# Patient Record
Sex: FEMALE | Race: WHITE | NOT HISPANIC OR LATINO | Employment: FULL TIME | URBAN - METROPOLITAN AREA
[De-identification: names, ages, dates, MRNs, and addresses within clinical notes are randomized per-mention and may not be internally consistent; named-entity substitution may affect disease eponyms.]

---

## 2017-05-06 ENCOUNTER — OFFICE VISIT (OUTPATIENT)
Dept: URGENT CARE | Facility: CLINIC | Age: 27
End: 2017-05-06
Payer: COMMERCIAL

## 2017-05-06 VITALS
OXYGEN SATURATION: 98 % | HEIGHT: 65 IN | WEIGHT: 137 LBS | SYSTOLIC BLOOD PRESSURE: 102 MMHG | HEART RATE: 61 BPM | BODY MASS INDEX: 22.82 KG/M2 | DIASTOLIC BLOOD PRESSURE: 66 MMHG | TEMPERATURE: 97.9 F

## 2017-05-06 DIAGNOSIS — S61.309A FINGERNAIL AVULSION, INITIAL ENCOUNTER: ICD-10-CM

## 2017-05-06 PROCEDURE — 99214 OFFICE O/P EST MOD 30 MIN: CPT | Performed by: PHYSICIAN ASSISTANT

## 2017-05-06 ASSESSMENT — ENCOUNTER SYMPTOMS
MUSCULOSKELETAL NEGATIVE: 1
WEAKNESS: 0
JOINT SWELLING: 0
NEUROLOGICAL NEGATIVE: 1
CONSTITUTIONAL NEGATIVE: 1
NUMBNESS: 0

## 2017-05-06 NOTE — MR AVS SNAPSHOT
"        Eleni Damon   2017 2:45 PM   Office Visit   MRN: [id number]    Department:  [facility]   Dept Phone:  [phone]    Description:  Female : 1990   Provider:  Jimmy Gale PA-C           Reason for Visit     Nail Problem X today, broke nail back on left hand       Allergies as of 2017     No Known Allergies      You were diagnosed with     Fingernail avulsion, initial encounter   [[id number]]         Vital Signs     Blood Pressure Pulse Temperature Height Weight Body Mass Index    102/66 mmHg 61 36.6 °C (97.9 °F) 1.651 m (5' 5\") 62.143 kg (137 lb) 22.80 kg/m2    Oxygen Saturation Last Menstrual Period Breastfeeding? Smoking Status          98% 2016 Yes Never Smoker         Basic Information     Date Of Birth Sex Race Ethnicity Preferred Language    1990 Female White Non- English      Problem List              ICD-10-CM Priority Class Noted - Resolved    Labor and delivery, indication for care O75.9   2016 - Present      Health Maintenance        Date Due Completion Dates    PAP SMEAR 8/10/2009 ---    IMM DTaP/Tdap/Td Vaccine (2 - Td) 2026            Current Immunizations     Tdap Vaccine 2016      Below and/or attached are the medications your provider expects you to take. Review all of your home medications and newly ordered medications with your provider and/or pharmacist. Follow medication instructions as directed by your provider and/or pharmacist. Please keep your medication list with you and share with your provider. Update the information when medications are discontinued, doses are changed, or new medications (including over-the-counter products) are added; and carry medication information at all times in the event of emergency situations     Allergies:  No Known Allergies          Medications  Valid as of: May 06, 2017 -  3:25 PM    Generic Name Brand Name Tablet Size Instructions for use    Prenatal MV-Min-Fe Fum-FA-DHA   Take  by " mouth.        .                 Medicines prescribed today were sent to:     [id number] - [place], [place] - [address]    [address] [place] [address]    Phone: [phone] Fax: [phone]    Open 24 Hours?: No      Medication refill instructions:       If your prescription bottle indicates you have medication refills left, it is not necessary to call your provider’s office. Please contact your pharmacy and they will refill your medication.    If your prescription bottle indicates you do not have any refills left, you may request refills at any time through one of the following ways: The online [employer] system (except Urgent Care), by calling your provider’s office, or by asking your pharmacy to contact your provider’s office with a refill request. Medication refills are processed only during regular business hours and may not be available until the next business day. Your provider may request additional information or to have a follow-up visit with you prior to refilling your medication.   *Please Note: Medication refills are assigned a new Rx number when refilled electronically. Your pharmacy may indicate that no refills were authorized even though a new prescription for the same medication is available at the pharmacy. Please request the medicine by name with the pharmacy before contacting your provider for a refill.           [employer] Access Code: [email or web address]  Expires: 6/5/2017  3:25 PM    [employer]  A secure, online tool to manage your health information     [employer]’s [employer]® is a secure, online tool that connects you to your personalized health information from the privacy of your home -- day or night - making it very easy for you to manage your healthcare. Once the activation process is completed, you can even access your medical information using the [employer] cristobal, which is available for free in the Apple Cristobal store or Google Play store.     [employer] provides the following levels  of access (as shown below):   My Chart Features   [facility] Doctor [facility]  Specialists [facility]  Urgent  Care Non-[facility]  Primary Care  Doctor   Email your healthcare team securely and privately 24/7 X X X    Manage appointments: schedule your next appointment; view details of past/upcoming appointments X      Request prescription refills. X      View recent personal medical records, including lab and immunizations X X X X   View health record, including health history, allergies, medications X X X X   Read reports about your outpatient visits, procedures, consult and ER notes X X X X   See your discharge summary, which is a recap of your hospital and/or ER visit that includes your diagnosis, lab results, and care plan. X X       How to register for [employer]:  1. Go to  [email or web address].  2. Click on the Sign Up Now box, which takes you to the New Member Sign Up page. You will need to provide the following information:  a. Enter your Pin digital Access Code exactly as it appears at the top of this page. (You will not need to use this code after you’ve completed the sign-up process. If you do not sign up before the expiration date, you must request a new code.)   b. Enter your date of birth.   c. Enter your home email address.   d. Click Submit, and follow the next screen’s instructions.  3. Create a Pin digital ID. This will be your [employer] digital login ID and cannot be changed, so think of one that is secure and easy to remember.  4. Create a Pin digital password. You can change your password at any time.  5. Enter your Password Reset Question and Answer. This can be used at a later time if you forget your password.   6. Enter your e-mail address. This allows you to receive e-mail notifications when new information is available in Pin digital.  7. Click Sign Up. You can now view your health information.    For assistance activating your [employer] digital account, call [phone]

## 2017-05-06 NOTE — PROGRESS NOTES
"Subjective:      Eleni Jose is a 26 y.o. female who presents with Nail Problem            Nail Problem  This is a new problem. The current episode started today (fingernail avuls). The problem occurs constantly. The problem has been unchanged. Pertinent negatives include no joint swelling, numbness or weakness. Nothing aggravates the symptoms. She has tried nothing for the symptoms. The treatment provided no relief.       Review of Systems   Constitutional: Negative.    Musculoskeletal: Negative.  Negative for joint swelling.   Skin: Negative.    Neurological: Negative.  Negative for weakness and numbness.          Objective:     /66 mmHg  Pulse 61  Temp(Src) 36.6 °C (97.9 °F)  Ht 1.651 m (5' 5\")  Wt 62.143 kg (137 lb)  BMI 22.80 kg/m2  SpO2 98%  LMP 02/05/2016  Breastfeeding? Yes     Physical Exam   Constitutional: She is oriented to person, place, and time. She appears well-developed and well-nourished. No distress.   Musculoskeletal: Normal range of motion. She exhibits tenderness. She exhibits no edema.   4th fingernail sl loose  Proc- 1%lidoc local; undermined nail w/hemostats, pulled nail off w/o problem, n.bed nl. , abx dssg     Neurological: She is alert and oriented to person, place, and time. She exhibits normal muscle tone. Coordination normal.   Skin: Skin is warm and dry. No erythema.   Psychiatric: She has a normal mood and affect. Her behavior is normal. Judgment and thought content normal.   Nursing note and vitals reviewed.    Filed Vitals:    05/06/17 1452   BP: 102/66   Pulse: 61   Temp: 36.6 °C (97.9 °F)   Height: 1.651 m (5' 5\")   Weight: 62.143 kg (137 lb)   SpO2: 98%     Active Ambulatory Problems     Diagnosis Date Noted   • Labor and delivery, indication for care 11/08/2016     Resolved Ambulatory Problems     Diagnosis Date Noted   • No Resolved Ambulatory Problems     No Additional Past Medical History     Current Outpatient Prescriptions on File Prior to Visit   Medication " Sig Dispense Refill   • Prenatal MV-Min-Fe Fum-FA-DHA (PRENATAL 1 PO) Take  by mouth.       No current facility-administered medications on file prior to visit.     Gargles, Cepacol lozenges, Aleve/Advil as needed for throat pain  History reviewed. No pertinent family history.  Review of patient's allergies indicates no known allergies.              Assessment/Plan:     ·  fingernail avuls      · Daily wound care, nsaids

## 2018-02-05 ENCOUNTER — OFFICE VISIT (OUTPATIENT)
Dept: URGENT CARE | Facility: CLINIC | Age: 28
End: 2018-02-05
Payer: COMMERCIAL

## 2018-02-05 VITALS
DIASTOLIC BLOOD PRESSURE: 70 MMHG | BODY MASS INDEX: 22.49 KG/M2 | TEMPERATURE: 98.2 F | WEIGHT: 135 LBS | SYSTOLIC BLOOD PRESSURE: 110 MMHG | RESPIRATION RATE: 20 BRPM | OXYGEN SATURATION: 98 % | HEART RATE: 80 BPM | HEIGHT: 65 IN

## 2018-02-05 DIAGNOSIS — J02.9 EXUDATIVE PHARYNGITIS: ICD-10-CM

## 2018-02-05 LAB
INT CON NEG: NORMAL
INT CON POS: NORMAL
S PYO AG THROAT QL: NEGATIVE

## 2018-02-05 PROCEDURE — 99214 OFFICE O/P EST MOD 30 MIN: CPT | Performed by: PHYSICIAN ASSISTANT

## 2018-02-05 PROCEDURE — 87880 STREP A ASSAY W/OPTIC: CPT | Performed by: PHYSICIAN ASSISTANT

## 2018-02-05 RX ORDER — AZITHROMYCIN 250 MG/1
TABLET, FILM COATED ORAL
Qty: 6 TAB | Refills: 0 | Status: SHIPPED | OUTPATIENT
Start: 2018-02-05 | End: 2021-03-24

## 2018-02-05 ASSESSMENT — ENCOUNTER SYMPTOMS
MYALGIAS: 0
FEVER: 1
HOARSE VOICE: 1
SENSORY CHANGE: 0
DIARRHEA: 0
SWOLLEN GLANDS: 1
TINGLING: 0
CHILLS: 1
HEADACHES: 1
NAUSEA: 0
WHEEZING: 0
VOMITING: 0
SORE THROAT: 1
COUGH: 1
FOCAL WEAKNESS: 0
PALPITATIONS: 0
SPUTUM PRODUCTION: 1
ABDOMINAL PAIN: 0
SHORTNESS OF BREATH: 0

## 2018-02-05 ASSESSMENT — PATIENT HEALTH QUESTIONNAIRE - PHQ9: CLINICAL INTERPRETATION OF PHQ2 SCORE: 0

## 2018-02-05 NOTE — PROGRESS NOTES
"Subjective:      Eleni Jose is a 27 y.o. female who presents with Pharyngitis            Pharyngitis    This is a new problem. Episode onset: 1 week  The problem has been waxing and waning. The maximum temperature recorded prior to her arrival was 101 - 101.9 F (this am). Associated symptoms include coughing (mild cough- productive with green mucous), headaches, a hoarse voice and swollen glands. Pertinent negatives include no abdominal pain, congestion, diarrhea, ear pain, plugged ear sensation, shortness of breath or vomiting. She has tried NSAIDs for the symptoms. The treatment provided mild relief.       No past medical history on file.    No past surgical history on file.    No family history on file.    No Known Allergies    Medications, Allergies, and current problem list reviewed today in Epic    Review of Systems   Constitutional: Positive for chills, fever and malaise/fatigue.   HENT: Positive for hoarse voice and sore throat. Negative for congestion and ear pain.    Respiratory: Positive for cough (mild cough- productive with green mucous) and sputum production. Negative for shortness of breath and wheezing.    Cardiovascular: Negative for chest pain, palpitations and leg swelling.   Gastrointestinal: Negative for abdominal pain, diarrhea, nausea and vomiting.   Musculoskeletal: Negative for myalgias.   Neurological: Positive for headaches. Negative for tingling, sensory change and focal weakness.     All other systems reviewed and are negative.        Objective:     /70   Pulse 80   Temp 36.8 °C (98.2 °F)   Resp 20   Ht 1.651 m (5' 5\")   Wt 61.2 kg (135 lb)   SpO2 98%   BMI 22.47 kg/m²      Physical Exam   Constitutional: She is oriented to person, place, and time. She appears well-developed and well-nourished. No distress.   HENT:   Head: Normocephalic and atraumatic.   Right Ear: Tympanic membrane, external ear and ear canal normal.   Left Ear: Tympanic membrane, external ear and ear " canal normal.   Nose: Mucosal edema and rhinorrhea present.   Mouth/Throat: Uvula is midline and mucous membranes are normal. Oropharyngeal exudate and posterior oropharyngeal erythema present. Tonsils are 1+ on the right. Tonsils are 1+ on the left. Tonsillar exudate.   Neck: Neck supple.   Cardiovascular: Normal rate, regular rhythm and normal heart sounds.  Exam reveals no gallop and no friction rub.    No murmur heard.  Pulmonary/Chest: Effort normal. No respiratory distress. She has no wheezes. She has no rales.   Lymphadenopathy:     She has cervical adenopathy (mild anterior cervical adenopathy ).   Neurological: She is alert and oriented to person, place, and time. No cranial nerve deficit.   Skin: Skin is warm and dry. No rash noted.   Psychiatric: She has a normal mood and affect. Her behavior is normal. Judgment and thought content normal.               Assessment/Plan:     1. Exudative pharyngitis  azithromycin (ZITHROMAX) 250 MG Tab         Current Outpatient Prescriptions:   •  azithromycin (ZITHROMAX) 250 MG Tab, Take as directed on package. 1 pack., Disp: 6 Tab, Rfl: 0    - encouraged fluids, rest, humidification, salt water gargles and throat lozenges.     Differential diagnoses, Supportive care, and indications for immediate follow-up discussed with patient.   Instructed to return to clinic or nearest emergency department for any change in condition, further concerns, or worsening of symptoms.    The patient demonstrated a good understanding and agreed with the treatment plan.    Salome Farnsworth P.A.-C.

## 2021-02-03 ENCOUNTER — TELEPHONE (OUTPATIENT)
Dept: SCHEDULING | Facility: IMAGING CENTER | Age: 31
End: 2021-02-03

## 2021-02-28 ENCOUNTER — OFFICE VISIT (OUTPATIENT)
Dept: URGENT CARE | Facility: CLINIC | Age: 31
End: 2021-02-28
Payer: COMMERCIAL

## 2021-02-28 VITALS
BODY MASS INDEX: 24.16 KG/M2 | HEIGHT: 65 IN | RESPIRATION RATE: 16 BRPM | WEIGHT: 145 LBS | OXYGEN SATURATION: 97 % | TEMPERATURE: 97.9 F | HEART RATE: 75 BPM | SYSTOLIC BLOOD PRESSURE: 106 MMHG | DIASTOLIC BLOOD PRESSURE: 70 MMHG

## 2021-02-28 DIAGNOSIS — H60.331 ACUTE SWIMMER'S EAR OF RIGHT SIDE: ICD-10-CM

## 2021-02-28 PROCEDURE — 99204 OFFICE O/P NEW MOD 45 MIN: CPT | Performed by: NURSE PRACTITIONER

## 2021-02-28 RX ORDER — OFLOXACIN 3 MG/ML
5 SOLUTION AURICULAR (OTIC) DAILY
Qty: 10 ML | Refills: 0 | Status: SHIPPED | OUTPATIENT
Start: 2021-02-28 | End: 2021-04-14

## 2021-02-28 RX ORDER — CIPROFLOXACIN AND DEXAMETHASONE 3; 1 MG/ML; MG/ML
4 SUSPENSION/ DROPS AURICULAR (OTIC) 2 TIMES DAILY
Qty: 2.8 ML | Refills: 0 | Status: SHIPPED | OUTPATIENT
Start: 2021-02-28 | End: 2021-03-07

## 2021-02-28 ASSESSMENT — ENCOUNTER SYMPTOMS
SORE THROAT: 0
COUGH: 0
CHILLS: 0
DIZZINESS: 0
HEADACHES: 0
MYALGIAS: 0
FEVER: 0
SHORTNESS OF BREATH: 0

## 2021-02-28 ASSESSMENT — PAIN SCALES - GENERAL: PAINLEVEL: 5=MODERATE PAIN

## 2021-02-28 NOTE — PROGRESS NOTES
Subjective:     Eleni Jose is a 30 y.o. female who presents for Otalgia (right ear x2 days)      HPI  Pt presents for evaluation of a new problem.  Eleni is a pleasant 30-year-old female who presents to urgent care today with complaints of right ear pain that started yesterday.  Her pain initially started when she got home from [place].  She notes that she was visiting [place].  Her pain level is rated as a 6/10.  She has been using ibuprofen for the pain which has provided moderate relief. Associated symptoms include stomach bug, fever, body aches. This has all resolved except for her ear pain.     Review of Systems   Constitutional: Negative for chills, fever and malaise/fatigue.   HENT: Positive for ear pain. Negative for congestion, hearing loss, sore throat and tinnitus.    Respiratory: Negative for cough and shortness of breath.    Musculoskeletal: Negative for myalgias.   Skin: Negative for rash.   Neurological: Negative for dizziness and headaches.       PMH: History reviewed. No pertinent past medical history.  ALLERGIES: No Known Allergies  SURGHX: History reviewed. No pertinent surgical history.  SOCHX:   Social History     Socioeconomic History   • Marital status:      Spouse name: Not on file   • Number of children: Not on file   • Years of education: Not on file   • Highest education level: Not on file   Occupational History   • Not on file   Tobacco Use   • Smoking status: Never Smoker   • Smokeless tobacco: Never Used   Substance and Sexual Activity   • Alcohol use: No   • Drug use: No   • Sexual activity: Not on file   Other Topics Concern   • Not on file   Social History Narrative   • Not on file     Social Determinants of Health     Financial Resource Strain:    • Difficulty of Paying Living Expenses:    Food Insecurity:    • Worried About Running Out of Food in the Last Year:    • Ran Out of Food in the Last Year:    Transportation Needs:    •  "Lack of Transportation (Medical):    • Lack of Transportation (Non-Medical):    Physical Activity:    • Days of Exercise per Week:    • Minutes of Exercise per Session:    Stress:    • Feeling of Stress :    Social Connections:    • Frequency of Communication with Friends and Family:    • Frequency of Social Gatherings with Friends and Family:    • Attends Taoism Services:    • Active Member of Clubs or Organizations:    • Attends Club or Organization Meetings:    • Marital Status:    Intimate Partner Violence:    • Fear of Current or Ex-Partner:    • Emotionally Abused:    • Physically Abused:    • Sexually Abused:      FH: No family history on file.      Objective:   /70 (BP Location: Left arm, Patient Position: Sitting)   Pulse 75   Temp 36.6 °C (97.9 °F) (Tympanic)   Resp 16   Ht 1.651 m (5' 5\")   Wt 65.8 kg (145 lb)   LMP 02/14/2021 (Approximate)   SpO2 97%   Breastfeeding Unknown   BMI 24.13 kg/m²     Physical Exam  Vitals and nursing note reviewed.   Constitutional:       General: She is not in acute distress.     Appearance: Normal appearance. She is not ill-appearing.   HENT:      Head: Normocephalic and atraumatic.      Right Ear: Tympanic membrane and external ear normal. Swelling and tenderness present. There is impacted cerumen.      Left Ear: Tympanic membrane, ear canal and external ear normal. There is no impacted cerumen.      Ears:      Comments: Right ear canal erythemic and swollen.  Pain with palpation.     Nose: No congestion or rhinorrhea.      Mouth/Throat:      Mouth: Mucous membranes are moist.   Eyes:      Extraocular Movements: Extraocular movements intact.      Pupils: Pupils are equal, round, and reactive to light.   Cardiovascular:      Rate and Rhythm: Normal rate and regular rhythm.      Pulses: Normal pulses.      Heart sounds: Normal heart sounds.   Pulmonary:      Effort: Pulmonary effort is normal.      Breath sounds: Normal breath sounds.   Abdominal:      " General: Abdomen is flat. Bowel sounds are normal.      Palpations: Abdomen is soft.      Tenderness: There is no abdominal tenderness.   Musculoskeletal:         General: Normal range of motion.      Cervical back: Normal range of motion and neck supple.   Skin:     General: Skin is warm and dry.      Capillary Refill: Capillary refill takes less than 2 seconds.   Neurological:      General: No focal deficit present.      Mental Status: She is alert and oriented to person, place, and time. Mental status is at baseline.   Psychiatric:         Mood and Affect: Mood normal.         Behavior: Behavior normal.         Thought Content: Thought content normal.         Judgment: Judgment normal.         Assessment/Plan:   Assessment    1. Acute swimmer's ear of right side  ofloxacin otic sol (FLOXIN OTIC) 0.3 % Solution    ciprofloxacin/dexamethasone (CIPRODEX) 0.3-0.1 % Suspension     2 different prescription sent in for relief of otitis externa.  If Ciprodex is affordable she was encouraged to use this.  Otherwise, ofloxacin is to be used.  She verbalizes understanding.  Ibuprofen/Tylenol may be used for relief of pain.  Follow-up in clinic for persistent or worsening symptoms.  AVS handout given and reviewed with patient. Pt educated on red flags and when to seek treatment back in ER or UC.

## 2021-03-03 DIAGNOSIS — H60.331 ACUTE SWIMMER'S EAR OF RIGHT SIDE: ICD-10-CM

## 2021-03-03 RX ORDER — SULFAMETHOXAZOLE AND TRIMETHOPRIM 800; 160 MG/1; MG/1
1 TABLET ORAL 2 TIMES DAILY
Qty: 14 TABLET | Refills: 0 | Status: SHIPPED
Start: 2021-03-03 | End: 2021-03-10

## 2021-03-24 ENCOUNTER — OFFICE VISIT (OUTPATIENT)
Dept: URGENT CARE | Facility: CLINIC | Age: 31
End: 2021-03-24
Payer: COMMERCIAL

## 2021-03-24 ENCOUNTER — HOSPITAL ENCOUNTER (OUTPATIENT)
Facility: MEDICAL CENTER | Age: 31
End: 2021-03-24
Attending: PHYSICIAN ASSISTANT
Payer: COMMERCIAL

## 2021-03-24 VITALS
WEIGHT: 143.2 LBS | OXYGEN SATURATION: 97 % | HEIGHT: 65 IN | BODY MASS INDEX: 23.86 KG/M2 | SYSTOLIC BLOOD PRESSURE: 102 MMHG | DIASTOLIC BLOOD PRESSURE: 64 MMHG | HEART RATE: 93 BPM | RESPIRATION RATE: 18 BRPM | TEMPERATURE: 98.3 F

## 2021-03-24 DIAGNOSIS — R10.9 FLANK PAIN, ACUTE: ICD-10-CM

## 2021-03-24 DIAGNOSIS — N30.00 ACUTE CYSTITIS WITHOUT HEMATURIA: ICD-10-CM

## 2021-03-24 LAB
APPEARANCE UR: NORMAL
BILIRUB UR STRIP-MCNC: NORMAL MG/DL
COLOR UR AUTO: NORMAL
GLUCOSE UR STRIP.AUTO-MCNC: NORMAL MG/DL
INT CON NEG: NEGATIVE
INT CON POS: POSITIVE
KETONES UR STRIP.AUTO-MCNC: NORMAL MG/DL
LEUKOCYTE ESTERASE UR QL STRIP.AUTO: NORMAL
NITRITE UR QL STRIP.AUTO: NORMAL
PH UR STRIP.AUTO: 5.5 [PH] (ref 5–8)
POC URINE PREGNANCY TEST: NEGATIVE
PROT UR QL STRIP: 30 MG/DL
RBC UR QL AUTO: NORMAL
SP GR UR STRIP.AUTO: 1.01
UROBILINOGEN UR STRIP-MCNC: 0.2 MG/DL

## 2021-03-24 PROCEDURE — 87086 URINE CULTURE/COLONY COUNT: CPT

## 2021-03-24 PROCEDURE — 81002 URINALYSIS NONAUTO W/O SCOPE: CPT | Performed by: PHYSICIAN ASSISTANT

## 2021-03-24 PROCEDURE — 87077 CULTURE AEROBIC IDENTIFY: CPT

## 2021-03-24 PROCEDURE — 99214 OFFICE O/P EST MOD 30 MIN: CPT | Performed by: PHYSICIAN ASSISTANT

## 2021-03-24 PROCEDURE — 99000 SPECIMEN HANDLING OFFICE-LAB: CPT | Performed by: PHYSICIAN ASSISTANT

## 2021-03-24 PROCEDURE — 81025 URINE PREGNANCY TEST: CPT | Performed by: PHYSICIAN ASSISTANT

## 2021-03-24 PROCEDURE — 87186 SC STD MICRODIL/AGAR DIL: CPT

## 2021-03-24 RX ORDER — COVID-19 MOLECULAR TEST ASSAY
KIT MISCELLANEOUS
COMMUNITY
Start: 2021-02-27 | End: 2021-03-24

## 2021-03-24 RX ORDER — NORGESTIMATE AND ETHINYL ESTRADIOL 0.25-0.035
1 KIT ORAL
COMMUNITY
Start: 2021-02-15 | End: 2024-03-19

## 2021-03-24 RX ORDER — CIPROFLOXACIN 500 MG/1
500 TABLET, FILM COATED ORAL 2 TIMES DAILY
Qty: 14 TABLET | Refills: 0 | Status: SHIPPED | OUTPATIENT
Start: 2021-03-24 | End: 2021-03-31

## 2021-03-24 ASSESSMENT — ENCOUNTER SYMPTOMS
DIZZINESS: 0
NAUSEA: 0
DIARRHEA: 0
ABDOMINAL PAIN: 0
CHILLS: 0
SHORTNESS OF BREATH: 0
FLANK PAIN: 1
VOMITING: 0
FEVER: 0

## 2021-03-24 ASSESSMENT — PAIN SCALES - GENERAL: PAINLEVEL: 5=MODERATE PAIN

## 2021-03-24 NOTE — PROGRESS NOTES
"Subjective:      Eleni Jose is a 30 y.o. female who presents with UTI (frequent/painful urilnation little output/ right flank pain x3 days)            Dysuria   This is a new problem. The current episode started in the past 7 days (3 days). The problem occurs every urination. The problem has been unchanged. The quality of the pain is described as burning. The pain is mild. There has been no fever. There is no history of pyelonephritis. Associated symptoms include flank pain, frequency and urgency. Pertinent negatives include no chills, hematuria, nausea or vomiting. She has tried NSAIDs for the symptoms. The treatment provided mild relief. There is no history of catheterization, kidney stones or recurrent UTIs.     Patient presents to urgent care reporting a 3 day history of dysuria, frequency, urgency, and new onset of right sided flank pain today. No fevers, chills, body aches, nausea, vomiting, abdominal pain, or hematuria. No history of kidney stones.       Review of Systems   Constitutional: Negative for chills and fever.   HENT: Negative for congestion.    Respiratory: Negative for shortness of breath.    Cardiovascular: Negative for chest pain.   Gastrointestinal: Negative for abdominal pain, diarrhea, nausea and vomiting.   Genitourinary: Positive for dysuria, flank pain, frequency and urgency. Negative for hematuria.   Skin: Negative for rash.   Neurological: Negative for dizziness.        Objective:     /64 (BP Location: Left arm, Patient Position: Sitting)   Pulse 93   Temp 36.8 °C (98.3 °F) (Tympanic)   Resp 18   Ht 1.651 m (5' 5\")   Wt 65 kg (143 lb 3.2 oz)   LMP 03/14/2021   SpO2 97%   BMI 23.83 kg/m²      Physical Exam  Vitals and nursing note reviewed.   Constitutional:       General: She is not in acute distress.     Appearance: Normal appearance. She is well-developed. She is not diaphoretic.   HENT:      Head: Normocephalic and atraumatic.      Right Ear: External ear normal.      " Left Ear: External ear normal.      Nose: Nose normal.   Eyes:      Pupils: Pupils are equal, round, and reactive to light.   Cardiovascular:      Rate and Rhythm: Normal rate.   Pulmonary:      Effort: Pulmonary effort is normal.   Musculoskeletal:         General: Normal range of motion.   Skin:     General: Skin is warm and dry.   Neurological:      Mental Status: She is alert and oriented to person, place, and time.   Psychiatric:         Behavior: Behavior normal.            PMH:  has no past medical history on file.  MEDS:   Current Outpatient Medications:   •  ESTARYLLA 0.25-35 MG-MCG per tablet, Take 1 tablet by mouth every day., Disp: , Rfl:   •  ciprofloxacin (CIPRO) 500 MG Tab, Take 1 tablet by mouth 2 times a day for 7 days., Disp: 14 tablet, Rfl: 0  •  ID NOW COVID-19 Kit, TEST AS DIRECTED, Disp: , Rfl:   •  ofloxacin otic sol (FLOXIN OTIC) 0.3 % Solution, Administer 5 Drops into affected ear(s) every day., Disp: 10 mL, Rfl: 0  •  azithromycin (ZITHROMAX) 250 MG Tab, Take as directed on package. 1 pack. (Patient not taking: Reported on 2/28/2021), Disp: 6 Tab, Rfl: 0  •  Prenatal MV-Min-Fe Fum-FA-DHA (PRENATAL 1 PO), Take  by mouth., Disp: , Rfl:   ALLERGIES: No Known Allergies  SURGHX: History reviewed. No pertinent surgical history.  SOCHX:  reports that she has never smoked. She has never used smokeless tobacco. She reports that she does not drink alcohol and does not use drugs.  FH: family history is not on file.    POCT Urinalysis:  Ref Range & Units  1:54 PM    POC Color Negative LIGHT YELLOW    POC Appearance Negative CLOUDY    POC Leukocyte Esterase Negative MOD    POC Nitrites Negative POS    POC Urobiligen Negative (0.2) mg/dL 0.2    POC Protein Negative mg/dL 30    POC Urine PH 5.0 - 8.0 5.5    POC Blood Negative MOD    POC Specific Gravity <1.005 - >1.030 1.015    POC Ketones Negative mg/dL NEG    POC Bilirubin Negative mg/dL NEG    POC Glucose Negative mg/dL NEG        Assessment/Plan:         1. Acute cystitis without hematuria    - POCT Urinalysis --> + leuks, + nitrites, + blood  - POCT Pregnancy --> negative   - URINE CULTURE  - ciprofloxacin (CIPRO) 500 MG Tab; Take 1 tablet by mouth 2 times a day for 7 days.  Dispense: 14 tablet; Refill: 0   - Complete full course of antibiotics as prescribed     - Pt educated on preventative measures for avoiding future UTIs  - Advised to increase fluid intake  - OTC Pyridium (Azo) for symptomatic relief, advised that it will turn urine orange in color  - Pending urine culture  - ER precautions given regarding pyelonephritis including fevers greater than 101 and, vomiting and dehydration, increased back pain.      2. Flank pain, acute

## 2021-03-28 LAB
BACTERIA UR CULT: ABNORMAL
BACTERIA UR CULT: ABNORMAL
SIGNIFICANT IND 70042: ABNORMAL
SITE SITE: ABNORMAL
SOURCE SOURCE: ABNORMAL

## 2021-04-14 ENCOUNTER — OFFICE VISIT (OUTPATIENT)
Dept: MEDICAL GROUP | Facility: LAB | Age: 31
End: 2021-04-14
Payer: COMMERCIAL

## 2021-04-14 VITALS
OXYGEN SATURATION: 97 % | BODY MASS INDEX: 23.49 KG/M2 | SYSTOLIC BLOOD PRESSURE: 110 MMHG | HEIGHT: 65 IN | WEIGHT: 141 LBS | HEART RATE: 68 BPM | DIASTOLIC BLOOD PRESSURE: 80 MMHG | TEMPERATURE: 98 F | RESPIRATION RATE: 16 BRPM

## 2021-04-14 DIAGNOSIS — M79.671 RIGHT FOOT PAIN: ICD-10-CM

## 2021-04-14 DIAGNOSIS — L81.9 CHANGING PIGMENTED SKIN LESION: ICD-10-CM

## 2021-04-14 DIAGNOSIS — Z13.1 SCREENING FOR DIABETES MELLITUS: ICD-10-CM

## 2021-04-14 DIAGNOSIS — Z13.29 SCREENING FOR THYROID DISORDER: ICD-10-CM

## 2021-04-14 DIAGNOSIS — Z13.220 SCREENING FOR HYPERLIPIDEMIA: ICD-10-CM

## 2021-04-14 DIAGNOSIS — Z80.3 FAMILY HISTORY OF BREAST CANCER IN SISTER: ICD-10-CM

## 2021-04-14 DIAGNOSIS — Z76.89 ESTABLISHING CARE WITH NEW DOCTOR, ENCOUNTER FOR: ICD-10-CM

## 2021-04-14 DIAGNOSIS — Z13.21 ENCOUNTER FOR VITAMIN DEFICIENCY SCREENING: ICD-10-CM

## 2021-04-14 DIAGNOSIS — Z13.0 SCREENING FOR DEFICIENCY ANEMIA: ICD-10-CM

## 2021-04-14 PROCEDURE — 99204 OFFICE O/P NEW MOD 45 MIN: CPT | Performed by: FAMILY MEDICINE

## 2021-04-14 ASSESSMENT — PATIENT HEALTH QUESTIONNAIRE - PHQ9: CLINICAL INTERPRETATION OF PHQ2 SCORE: 0

## 2021-04-14 NOTE — ASSESSMENT & PLAN NOTE
Pain in the ball of her foot.  Increased with pressure and worse with flats.  Better in athletic shoes.  No trauma that she can remember.

## 2021-04-14 NOTE — PATIENT INSTRUCTIONS
Mercedes Neuralgia    Mercedes neuralgia is foot pain that affects the ball of the foot and the area near the toes. Mercedes neuralgia occurs when part of a nerve in the foot (digital nerve) is under too much pressure (compressed). When this happens over a long period of time, the nerve can thicken (neuroma) and cause pain. Pain usually occurs between the third and fourth toes.   Mercedes neuralgia can come and go but may get worse over time.  What are the causes?  This condition is caused by doing the same things over and over with your foot, such as:  · Activities such as running or jumping.  · Wearing shoes that are too tight.  What increases the risk?  You may be at higher risk for Mercedes neuralgia if you:  · Are female.  · Wear high heels.  · Wear shoes that are narrow or tight.  · Do activities that repeatedly stretch your toes, such as:  ? Running.  ? Ballet.  ? Long-distance walking.  What are the signs or symptoms?  The first symptom of Mercedes neuralgia is pain that spreads from the ball of the foot to the toes. It may feel like you are walking on a marble. Pain usually gets worse with walking and goes away at night. Other symptoms may include numbness and cramping of your toes. Both feet are equally affected, but rarely at the same time.  How is this diagnosed?  This condition is diagnosed based on your symptoms, your medical history, and a physical exam. Your health care provider may:  · Squeeze your foot just behind your toe.  · Ask you to move your toes to check for pain.  · Ask about your physical activity level.  You also may have imaging tests, such as an X-ray, ultrasound, or MRI.  How is this treated?  Treatment depends on how severe your condition is and what causes it. Treatment may involve:  · Wearing different shoes that are not too tight, are low-heeled, and provide good support. For some people, this is the only treatment needed.  · Wearing an over-the-counter or custom supportive pad (orthotic)  under the front of your foot.  · Getting injections of numbing medicine and anti-inflammatory medicine (steroid) in the nerve.  · Having surgery to remove part of the thickened nerve.  Follow these instructions at home:  Managing pain, stiffness, and swelling    · Massage your foot as needed.  · Wear orthotics as told by your health care provider.  · If directed, put ice on your foot:  ? Put ice in a plastic bag.  ? Place a towel between your skin and the bag.  ? Leave the ice on for 20 minutes, 2-3 times a day.  · Avoid activities that cause pain or make pain worse. If you play sports, ask your health care provider when it is safe for you to return to sports.  · Raise (elevate) your foot above the level of your heart while lying down and, when possible, while sitting.  General instructions  · Take over-the-counter and prescription medicines only as told by your health care provider.  · Do not drive or use heavy machinery while taking prescription pain medicine.  · Wear shoes that:  ? Have soft soles.  ? Have a wide toe area.  ? Provide arch support.  ? Do not pinch or squeeze your feet.  ? Have room for your orthotics, if applicable.  · Keep all follow-up visits as told by your health care provider. This is important.  Contact a health care provider if:  · Your symptoms get worse or do not get better with treatment and home care.  Summary  · Mercedes neuralgia is foot pain that affects the ball of the foot and the area near the toes. Pain usually occurs between the third and fourth toes, gets worse with walking, and goes away at night.  · Mercedes neuralgia occurs when part of a nerve in the foot (digital nerve) is under too much pressure. When this happens over a long period of time, the nerve can thicken (neuroma) and cause pain.  · This condition is caused by doing the same things over and over with your foot, such as running or jumping, wearing shoes that are too tight, or wearing high heels.  · Treatment may  involve wearing low-heeled shoes that are not too tight, wearing a supportive pad (orthotic) under the front of your foot, getting injections in the nerve, or having surgery to remove part of the thickened nerve.  This information is not intended to replace advice given to you by your health care provider. Make sure you discuss any questions you have with your health care provider.  Document Released: 03/26/2002 Document Revised: 01/01/2019 Document Reviewed: 01/01/2019  [employer] Patient Education © 2020 [employer]

## 2021-04-26 NOTE — PROGRESS NOTES
Chief Complaint   Patient presents with   • Foot Pain     right foot   • Nevus     2 on back   • Establish Care         Eleni Jose is a 30 y.o. female here to establish care and for evaluation and management of:        HPI:    Right foot pain  Pain in the ball of her foot.  Increased with pressure and worse with flats.  Better in athletic shoes.  No trauma that she can remember.    Family history of breast cancer in sister  10 years older - Diagnosed with stage 4 breast CA at age 41    Patient also has 2 moles on her back that seem to be becoming more raised and darker that she would like looked at.    No Known Allergies    Current medicines (including changes today)  Current Outpatient Medications   Medication Sig Dispense Refill   • ESTARYLLA 0.25-35 MG-MCG per tablet Take 1 tablet by mouth every day.       No current facility-administered medications for this visit.     She  has no past medical history of Allergy or Asthma.  She  has no past surgical history on file.  Social History     Tobacco Use   • Smoking status: Never Smoker   • Smokeless tobacco: Never Used   Substance Use Topics   • Alcohol use: Yes     Comment: a couple drinks a month   • Drug use: No     Social History     Social History Narrative   • Not on file     Family History   Problem Relation Age of Onset   • Cancer Mother         cervical   • Cancer Sister 43        stage 4 breast cancer   • Arterial Aneurysm Maternal Grandmother 35         young   • Cancer Maternal Grandfather 72        lung   • Diabetes Maternal Grandfather      Family Status   Relation Name Status   • Mo  Alive   • Fa  Alive   • Sis  Alive   • Bro  Alive   • MGMo     • MGFa     • PGMo     • PGFa           ROS  No fever or chills.  No nausea or vomiting.  No chest pain or palpitations.  No cough or SOB.  No pain with urination or hematuria.  No black or bloody stools.  All other systems reviewed and are negative     Objective:     BP  "110/80 (BP Location: Right arm, Patient Position: Sitting, BP Cuff Size: Adult)   Pulse 68   Temp 36.7 °C (98 °F) (Temporal)   Resp 16   Ht 1.651 m (5' 5\")   Wt 64 kg (141 lb)   SpO2 97%  Body mass index is 23.46 kg/m².  Physical Exam:      Well developed, well nourished.  Alert, oriented in no acute distress.  Psych: Eye contact is good, speech goal directed, affect calm  Eyes: conjunctiva non-injected, sclera non-icteric.  Neck Supple.  No adenopathy or masses in the neck or supraclavicular regions. No thyromegaly  Lungs: clear to auscultation bilaterally with good excursion. No wheezes or rhonchi  CV: regular rate and rhythm. No murmur  Skin: 2 dark moles on back with slightly of irregular borders.  Slightly raised  Right foot: No noticeable deformity, swelling or bruising. Tenderness to palpation of midfoot. ROM intact.  Sensation intact. 2+ pedal pulses.     Assessment and Plan:   The following treatment plan was discussed    1. Establishing care with new doctor, encounter for  History reviewed.    2. Right foot pain  Refer to podiatry for further evaluation and treatment  - REFERRAL TO PODIATRY    3. Family history of breast cancer in sister  Patient will need mammograms at age 38    4. Changing pigmented skin lesion  Refer to dermatology for further evaluation and treatment.  - REFERRAL TO DERMATOLOGY    5. Screening for deficiency anemia  Screening labs ordered.  Await results for counseling.    - CBC WITH DIFFERENTIAL; Future    6. Screening for diabetes mellitus  Screening labs ordered.  Await results for counseling.    - Comp Metabolic Panel; Future    7. Screening for hyperlipidemia  Screening labs ordered.  Await results for counseling.    - Lipid Profile; Future    8. Screening for thyroid disorder  Screening labs ordered.  Await results for counseling.    - TSH; Future    9. Encounter for vitamin deficiency screening  Screening labs ordered.  Await results for counseling.    - VITAMIN D,25 " HYDROXY; Future      Records requested    Any change or worsening of signs or symptoms, patient encouraged to follow-up or report to the emergency room for further evaluation. Patient understands and agrees.    Followup: Return in about 1 year (around 4/14/2022).

## 2022-09-15 ENCOUNTER — APPOINTMENT (RX ONLY)
Dept: URBAN - METROPOLITAN AREA CLINIC 4 | Facility: CLINIC | Age: 32
Setting detail: DERMATOLOGY
End: 2022-09-15

## 2022-09-15 DIAGNOSIS — L82.1 OTHER SEBORRHEIC KERATOSIS: ICD-10-CM

## 2022-09-15 DIAGNOSIS — Z71.89 OTHER SPECIFIED COUNSELING: ICD-10-CM

## 2022-09-15 DIAGNOSIS — D22 MELANOCYTIC NEVI: ICD-10-CM

## 2022-09-15 PROBLEM — D22.5 MELANOCYTIC NEVI OF TRUNK: Status: ACTIVE | Noted: 2022-09-15

## 2022-09-15 PROCEDURE — ? COUNSELING

## 2022-09-15 PROCEDURE — 99203 OFFICE O/P NEW LOW 30 MIN: CPT

## 2022-09-15 ASSESSMENT — LOCATION DETAILED DESCRIPTION DERM
LOCATION DETAILED: RIGHT RADIAL DORSAL HAND
LOCATION DETAILED: LEFT LATERAL ABDOMEN
LOCATION DETAILED: RIGHT RIB CAGE
LOCATION DETAILED: LEFT RADIAL DORSAL HAND
LOCATION DETAILED: RIGHT INFERIOR LATERAL MIDBACK

## 2022-09-15 ASSESSMENT — LOCATION ZONE DERM
LOCATION ZONE: TRUNK
LOCATION ZONE: HAND

## 2022-09-15 ASSESSMENT — LOCATION SIMPLE DESCRIPTION DERM
LOCATION SIMPLE: RIGHT LOWER BACK
LOCATION SIMPLE: RIGHT HAND
LOCATION SIMPLE: LEFT HAND
LOCATION SIMPLE: ABDOMEN

## 2023-02-25 ENCOUNTER — OFFICE VISIT (OUTPATIENT)
Dept: URGENT CARE | Facility: CLINIC | Age: 33
End: 2023-02-25
Payer: COMMERCIAL

## 2023-02-25 VITALS
DIASTOLIC BLOOD PRESSURE: 62 MMHG | WEIGHT: 150 LBS | BODY MASS INDEX: 24.99 KG/M2 | TEMPERATURE: 98.2 F | HEIGHT: 65 IN | HEART RATE: 88 BPM | SYSTOLIC BLOOD PRESSURE: 104 MMHG | RESPIRATION RATE: 16 BRPM | OXYGEN SATURATION: 98 %

## 2023-02-25 DIAGNOSIS — J06.9 VIRAL URI WITH COUGH: ICD-10-CM

## 2023-02-25 LAB
EXTERNAL QUALITY CONTROL: NORMAL
FLUAV+FLUBV AG SPEC QL IA: NEGATIVE
INT CON NEG: NORMAL
INT CON NEG: NORMAL
INT CON POS: NORMAL
INT CON POS: NORMAL
S PYO DNA SPEC NAA+PROBE: NOT DETECTED
SARS-COV+SARS-COV-2 AG RESP QL IA.RAPID: NEGATIVE

## 2023-02-25 PROCEDURE — 87651 STREP A DNA AMP PROBE: CPT | Performed by: FAMILY MEDICINE

## 2023-02-25 PROCEDURE — 87426 SARSCOV CORONAVIRUS AG IA: CPT | Performed by: FAMILY MEDICINE

## 2023-02-25 PROCEDURE — 87804 INFLUENZA ASSAY W/OPTIC: CPT | Performed by: FAMILY MEDICINE

## 2023-02-25 PROCEDURE — 99213 OFFICE O/P EST LOW 20 MIN: CPT | Performed by: FAMILY MEDICINE

## 2023-02-25 ASSESSMENT — ENCOUNTER SYMPTOMS
FEVER: 1
SORE THROAT: 1
MYALGIAS: 1
CHILLS: 1
HEADACHES: 1
DIARRHEA: 1

## 2023-02-25 NOTE — PROGRESS NOTES
"Subjective     Eleni Jose is a 32 y.o. female who presents with Cold Symptoms (X4 days, Sore throat, fever, chills body ache, diarrhea and headache )      - This is a pleasant and nontoxic appearing 32 y.o. female who has come to the walk-in clinic today for:    #1) ~3 days ago developed 1-3 episodes of non bloody vomiting and diarrhea, body aches, malaise, headaches, stuffy/runny nose, sore throat and fever. Feels like improving today and no fevers       ALLERGIES:  Patient has no known allergies.     PMH:  History reviewed. No pertinent past medical history.     PSH:  History reviewed. No pertinent surgical history.    MEDS:    Current Outpatient Medications:     ESTARYLLA 0.25-35 MG-MCG per tablet, Take 1 tablet by mouth every day., Disp: , Rfl:     ** I have documented what I find to be significant in regards to past medical, social, family and surgical history  in my HPI or under PMH/PSH/FH review section, otherwise it is noncontributory **         HPI    Review of Systems   Constitutional:  Positive for chills and fever.   HENT:  Positive for congestion and sore throat.    Gastrointestinal:  Positive for diarrhea.   Musculoskeletal:  Positive for myalgias.   Neurological:  Positive for headaches.   All other systems reviewed and are negative.           Objective     /62   Pulse 88   Temp 36.8 °C (98.2 °F) (Temporal)   Resp 16   Ht 1.651 m (5' 5\")   Wt 68 kg (150 lb)   SpO2 98%   BMI 24.96 kg/m²      Physical Exam  Vitals and nursing note reviewed.   Constitutional:       General: She is not in acute distress.     Appearance: Normal appearance. She is well-developed.   HENT:      Head: Normocephalic.   Cardiovascular:      Heart sounds: Normal heart sounds. No murmur heard.  Pulmonary:      Effort: Pulmonary effort is normal. No respiratory distress.      Breath sounds: Normal breath sounds.   Neurological:      Mental Status: She is alert.      Motor: No abnormal muscle tone.   Psychiatric:       "   Mood and Affect: Mood normal.         Behavior: Behavior normal.                           Assessment & Plan       1. Viral URI with cough  POCT CEPHEID GROUP A STREP - PCR    POCT Influenza A/B    POCT SARS-COV Antigen ARANZA (Symptomatic only)          - Dx, plan & d/c instructions discussed   - Rest, stay hydrated, OTC Motrin and/or Tylenol as needed      Follow up with your regular primary care providers office for a recheck on today's visit. ER if not improving in 2-3 days or if feeling/getting worse. (    Any realistic side effects of medications that may have been given today reviewed.     Patient left in stable condition     POCT results reviewed/discussed

## 2024-02-20 ENCOUNTER — OFFICE VISIT (OUTPATIENT)
Dept: MEDICAL GROUP | Age: 34
End: 2024-02-20
Payer: COMMERCIAL

## 2024-02-20 VITALS
HEIGHT: 65 IN | OXYGEN SATURATION: 100 % | DIASTOLIC BLOOD PRESSURE: 68 MMHG | TEMPERATURE: 97.4 F | HEART RATE: 72 BPM | BODY MASS INDEX: 24.99 KG/M2 | WEIGHT: 150 LBS | SYSTOLIC BLOOD PRESSURE: 126 MMHG

## 2024-02-20 DIAGNOSIS — Z00.00 BLOOD TESTS FOR ROUTINE GENERAL PHYSICAL EXAMINATION: ICD-10-CM

## 2024-02-20 DIAGNOSIS — Z91.89 AT RISK FOR BREAST CANCER: ICD-10-CM

## 2024-02-20 DIAGNOSIS — Z12.31 ENCOUNTER FOR SCREENING MAMMOGRAM FOR MALIGNANT NEOPLASM OF BREAST: ICD-10-CM

## 2024-02-20 PROBLEM — M79.671 RIGHT FOOT PAIN: Status: RESOLVED | Noted: 2021-04-14 | Resolved: 2024-02-20

## 2024-02-20 PROCEDURE — 3078F DIAST BP <80 MM HG: CPT | Performed by: STUDENT IN AN ORGANIZED HEALTH CARE EDUCATION/TRAINING PROGRAM

## 2024-02-20 PROCEDURE — 3074F SYST BP LT 130 MM HG: CPT | Performed by: STUDENT IN AN ORGANIZED HEALTH CARE EDUCATION/TRAINING PROGRAM

## 2024-02-20 PROCEDURE — 99213 OFFICE O/P EST LOW 20 MIN: CPT | Performed by: STUDENT IN AN ORGANIZED HEALTH CARE EDUCATION/TRAINING PROGRAM

## 2024-02-20 RX ORDER — NORGESTIMATE AND ETHINYL ESTRADIOL 7DAYSX3 28
1 KIT ORAL
COMMUNITY
Start: 2024-01-06

## 2024-02-20 ASSESSMENT — ENCOUNTER SYMPTOMS
WEAKNESS: 0
NAUSEA: 0
HEARTBURN: 0
NECK PAIN: 0
WHEEZING: 0
MYALGIAS: 0
BLURRED VISION: 0
SHORTNESS OF BREATH: 0
BLOOD IN STOOL: 0
DEPRESSION: 0
BRUISES/BLEEDS EASILY: 0
SINUS PAIN: 0
VOMITING: 0
DIZZINESS: 0
CONSTIPATION: 0
DOUBLE VISION: 0
FEVER: 0
HEADACHES: 0
COUGH: 0
BACK PAIN: 0
PALPITATIONS: 0
CHILLS: 0
ABDOMINAL PAIN: 0
SENSORY CHANGE: 0

## 2024-02-20 ASSESSMENT — PATIENT HEALTH QUESTIONNAIRE - PHQ9: CLINICAL INTERPRETATION OF PHQ2 SCORE: 0

## 2024-02-20 NOTE — PROGRESS NOTES
Subjective:     CC: Establish care    HPI:   Eleni presents today to establish care and she also has concerns or bowel movements changes over the past year.  Patient is otherwise healthy, no known medical problems. She has a sister with Breast cancer diagnosis at age 40.  Today she has a complaint of changes in her bowel habits. She states that over the past year or so she has been having sporadic bouts of loose stools. She states that her baseline is having a well formed and solid BM every other day for as long as she can remember, however over the past 12 months or so she has experience sporadic loose stools that last for 3-5 days and then goes back to her normal bowel habits. She states that this has occurred every 2 months or so and this this happens some of those days she might have up to 2 loose BM per day, she can not recall if stools sinks or floats, denies mucus, blood, melena, tenesmus, rectal pain, abdominal pain, bloating, or rashes, she does not believe to be due to specific foods, however she wonders if it could be secondary to stress?  I recommended to start a diary to see if we can notice a potential cause to her symptom. It does not look like this could be Celiac disease or Lactose intolerance.      Problem   Right Foot Pain (Resolved)       Health Maintenance: Completed    ROS:  Review of Systems   Constitutional:  Negative for chills, fever and malaise/fatigue.   HENT:  Negative for nosebleeds, sinus pain and tinnitus.    Eyes:  Negative for blurred vision and double vision.   Respiratory:  Negative for cough, shortness of breath and wheezing.    Cardiovascular:  Negative for chest pain, palpitations and leg swelling.   Gastrointestinal:  Negative for abdominal pain, blood in stool, constipation, heartburn, melena, nausea and vomiting.   Genitourinary:  Negative for dysuria and urgency.   Musculoskeletal:  Negative for back pain, joint pain, myalgias and neck pain.   Skin:  Negative for rash.  "  Neurological:  Negative for dizziness, sensory change, weakness and headaches.   Endo/Heme/Allergies:  Does not bruise/bleed easily.   Psychiatric/Behavioral:  Negative for depression and suicidal ideas.        Objective:     Exam:  /68 (BP Location: Right arm, Patient Position: Sitting, BP Cuff Size: Adult)   Pulse 72   Temp 36.3 °C (97.4 °F) (Temporal)   Ht 1.638 m (5' 4.5\")   Wt 68 kg (150 lb)   SpO2 100%   BMI 25.35 kg/m²  Body mass index is 25.35 kg/m².    Physical Exam  Vitals reviewed.   Constitutional:       General: She is not in acute distress.  HENT:      Head: Normocephalic and atraumatic.      Right Ear: Tympanic membrane and ear canal normal.      Left Ear: Tympanic membrane and ear canal normal.      Mouth/Throat:      Mouth: Mucous membranes are moist.      Pharynx: No oropharyngeal exudate.   Eyes:      General: No scleral icterus.     Extraocular Movements: Extraocular movements intact.      Pupils: Pupils are equal, round, and reactive to light.   Cardiovascular:      Rate and Rhythm: Normal rate and regular rhythm.      Pulses: Normal pulses.      Heart sounds: Normal heart sounds. No murmur heard.  Pulmonary:      Effort: Pulmonary effort is normal. No respiratory distress.      Breath sounds: Normal breath sounds. No wheezing.   Abdominal:      General: Bowel sounds are normal. There is no distension.      Palpations: Abdomen is soft.      Tenderness: There is no abdominal tenderness. There is no guarding or rebound.   Musculoskeletal:         General: No swelling. Normal range of motion.      Cervical back: Normal range of motion and neck supple.      Right lower leg: No edema.      Left lower leg: No edema.   Lymphadenopathy:      Cervical: No cervical adenopathy.   Skin:     General: Skin is warm.      Capillary Refill: Capillary refill takes less than 2 seconds.      Coloration: Skin is not jaundiced.      Findings: No bruising.   Neurological:      General: No focal deficit " present.      Mental Status: She is alert.      Motor: No weakness.   Psychiatric:         Mood and Affect: Mood normal.         Behavior: Behavior normal.         Labs: Ordered     Assessment & Plan:     33 y.o. female with the following -     1. At risk for breast cancer  - Sister diagnosed with Breast cancer at age 40  - No cancer on her mother    - BRCA1 and BRCA2 Sequencing; Future  - MA-SCREENING MAMMO BILAT W/TOMOSYNTHESIS W/CAD; Future    2. Blood tests for routine general physical examination  - Appropriate for age lab work    - Comp Metabolic Panel (fasting); Future  - TSH; Future  - CBC WITH DIFFERENTIAL; Future  - Lipid Profile; Future  - VITAMIN D,25 HYDROXY (DEFICIENCY); Future    3. Encounter for screening mammogram for malignant neoplasm of breast  - Sister with Breast cancer diagnosis at age 40  - Patient should be getting mammograms early due to fam hx    - MA-SCREENING MAMMO BILAT W/TOMOSYNTHESIS W/CAD; Future     4. BMI 25-25.9 %  - Borderline overweight per BMI  - Might not be significant at all  - Patient eats healthy and exercises regularly    Return in about 4 weeks (around 3/19/2024) for Labs.    Please note that this dictation was created using voice recognition software. I have made every reasonable attempt to correct obvious errors, but I expect that there are errors of grammar and possibly content that I did not discover before finalizing the note.

## 2024-02-21 ENCOUNTER — HOSPITAL ENCOUNTER (OUTPATIENT)
Dept: RADIOLOGY | Facility: MEDICAL CENTER | Age: 34
End: 2024-02-21
Payer: COMMERCIAL

## 2024-02-23 ENCOUNTER — APPOINTMENT (OUTPATIENT)
Dept: RADIOLOGY | Facility: MEDICAL CENTER | Age: 34
End: 2024-02-23
Attending: STUDENT IN AN ORGANIZED HEALTH CARE EDUCATION/TRAINING PROGRAM
Payer: COMMERCIAL

## 2024-02-23 DIAGNOSIS — Z12.31 ENCOUNTER FOR SCREENING MAMMOGRAM FOR MALIGNANT NEOPLASM OF BREAST: ICD-10-CM

## 2024-02-23 DIAGNOSIS — Z91.89 AT RISK FOR BREAST CANCER: ICD-10-CM

## 2024-02-23 PROCEDURE — 77067 SCR MAMMO BI INCL CAD: CPT

## 2024-03-01 ENCOUNTER — HOSPITAL ENCOUNTER (OUTPATIENT)
Dept: LAB | Facility: MEDICAL CENTER | Age: 34
End: 2024-03-01
Attending: STUDENT IN AN ORGANIZED HEALTH CARE EDUCATION/TRAINING PROGRAM
Payer: COMMERCIAL

## 2024-03-01 DIAGNOSIS — Z00.00 BLOOD TESTS FOR ROUTINE GENERAL PHYSICAL EXAMINATION: ICD-10-CM

## 2024-03-01 DIAGNOSIS — Z91.89 AT RISK FOR BREAST CANCER: ICD-10-CM

## 2024-03-01 LAB
25(OH)D3 SERPL-MCNC: 33 NG/ML (ref 30–100)
ALBUMIN SERPL BCP-MCNC: 4.1 G/DL (ref 3.2–4.9)
ALBUMIN/GLOB SERPL: 1.3 G/DL
ALP SERPL-CCNC: 52 U/L (ref 30–99)
ALT SERPL-CCNC: 9 U/L (ref 2–50)
ANION GAP SERPL CALC-SCNC: 9 MMOL/L (ref 7–16)
AST SERPL-CCNC: 14 U/L (ref 12–45)
BASOPHILS # BLD AUTO: 0.9 % (ref 0–1.8)
BASOPHILS # BLD: 0.04 K/UL (ref 0–0.12)
BILIRUB SERPL-MCNC: 0.4 MG/DL (ref 0.1–1.5)
BUN SERPL-MCNC: 12 MG/DL (ref 8–22)
CALCIUM ALBUM COR SERPL-MCNC: 9 MG/DL (ref 8.5–10.5)
CALCIUM SERPL-MCNC: 9.1 MG/DL (ref 8.4–10.2)
CHLORIDE SERPL-SCNC: 106 MMOL/L (ref 96–112)
CHOLEST SERPL-MCNC: 146 MG/DL (ref 100–199)
CO2 SERPL-SCNC: 23 MMOL/L (ref 20–33)
CREAT SERPL-MCNC: 0.82 MG/DL (ref 0.5–1.4)
EOSINOPHIL # BLD AUTO: 0.07 K/UL (ref 0–0.51)
EOSINOPHIL NFR BLD: 1.6 % (ref 0–6.9)
ERYTHROCYTE [DISTWIDTH] IN BLOOD BY AUTOMATED COUNT: 44.3 FL (ref 35.9–50)
FASTING STATUS PATIENT QL REPORTED: NORMAL
GFR SERPLBLD CREATININE-BSD FMLA CKD-EPI: 95 ML/MIN/1.73 M 2
GLOBULIN SER CALC-MCNC: 3.1 G/DL (ref 1.9–3.5)
GLUCOSE SERPL-MCNC: 90 MG/DL (ref 65–99)
HCT VFR BLD AUTO: 41.8 % (ref 37–47)
HDLC SERPL-MCNC: 60 MG/DL
HGB BLD-MCNC: 13.9 G/DL (ref 12–16)
IMM GRANULOCYTES # BLD AUTO: 0.01 K/UL (ref 0–0.11)
IMM GRANULOCYTES NFR BLD AUTO: 0.2 % (ref 0–0.9)
LDLC SERPL CALC-MCNC: 59 MG/DL
LYMPHOCYTES # BLD AUTO: 1.61 K/UL (ref 1–4.8)
LYMPHOCYTES NFR BLD: 35.9 % (ref 22–41)
MCH RBC QN AUTO: 30 PG (ref 27–33)
MCHC RBC AUTO-ENTMCNC: 33.3 G/DL (ref 32.2–35.5)
MCV RBC AUTO: 90.3 FL (ref 81.4–97.8)
MONOCYTES # BLD AUTO: 0.25 K/UL (ref 0–0.85)
MONOCYTES NFR BLD AUTO: 5.6 % (ref 0–13.4)
NEUTROPHILS # BLD AUTO: 2.5 K/UL (ref 1.82–7.42)
NEUTROPHILS NFR BLD: 55.8 % (ref 44–72)
NRBC # BLD AUTO: 0 K/UL
NRBC BLD-RTO: 0 /100 WBC (ref 0–0.2)
PLATELET # BLD AUTO: 206 K/UL (ref 164–446)
PMV BLD AUTO: 11.1 FL (ref 9–12.9)
POTASSIUM SERPL-SCNC: 4.5 MMOL/L (ref 3.6–5.5)
PROT SERPL-MCNC: 7.2 G/DL (ref 6–8.2)
RBC # BLD AUTO: 4.63 M/UL (ref 4.2–5.4)
SODIUM SERPL-SCNC: 138 MMOL/L (ref 135–145)
TRIGL SERPL-MCNC: 135 MG/DL (ref 0–149)
TSH SERPL DL<=0.005 MIU/L-ACNC: 1.64 UIU/ML (ref 0.38–5.33)
WBC # BLD AUTO: 4.5 K/UL (ref 4.8–10.8)

## 2024-03-01 PROCEDURE — 84443 ASSAY THYROID STIM HORMONE: CPT

## 2024-03-01 PROCEDURE — 81162 BRCA1&2 GEN FULL SEQ DUP/DEL: CPT

## 2024-03-01 PROCEDURE — 82306 VITAMIN D 25 HYDROXY: CPT

## 2024-03-01 PROCEDURE — 85025 COMPLETE CBC W/AUTO DIFF WBC: CPT

## 2024-03-01 PROCEDURE — 36415 COLL VENOUS BLD VENIPUNCTURE: CPT

## 2024-03-01 PROCEDURE — 80053 COMPREHEN METABOLIC PANEL: CPT

## 2024-03-01 PROCEDURE — 80061 LIPID PANEL: CPT

## 2024-03-11 LAB
BRCA1+BRCA2 DEL+DUP + FULL MUT ANL BLD/T: NEGATIVE
SPECIMEN SOURCE L312082: NORMAL

## 2024-03-19 ENCOUNTER — OFFICE VISIT (OUTPATIENT)
Dept: MEDICAL GROUP | Age: 34
End: 2024-03-19
Payer: COMMERCIAL

## 2024-03-19 VITALS
HEIGHT: 65 IN | WEIGHT: 151 LBS | BODY MASS INDEX: 25.16 KG/M2 | OXYGEN SATURATION: 96 % | HEART RATE: 63 BPM | TEMPERATURE: 97.9 F | SYSTOLIC BLOOD PRESSURE: 102 MMHG | DIASTOLIC BLOOD PRESSURE: 64 MMHG

## 2024-03-19 DIAGNOSIS — Z80.3 FAMILY HISTORY OF BREAST CANCER IN SISTER: ICD-10-CM

## 2024-03-19 PROCEDURE — 99213 OFFICE O/P EST LOW 20 MIN: CPT | Performed by: STUDENT IN AN ORGANIZED HEALTH CARE EDUCATION/TRAINING PROGRAM

## 2024-03-19 PROCEDURE — 3074F SYST BP LT 130 MM HG: CPT | Performed by: STUDENT IN AN ORGANIZED HEALTH CARE EDUCATION/TRAINING PROGRAM

## 2024-03-19 PROCEDURE — 3078F DIAST BP <80 MM HG: CPT | Performed by: STUDENT IN AN ORGANIZED HEALTH CARE EDUCATION/TRAINING PROGRAM

## 2024-03-19 ASSESSMENT — ENCOUNTER SYMPTOMS
SHORTNESS OF BREATH: 0
HEADACHES: 0
BRUISES/BLEEDS EASILY: 0
BLOOD IN STOOL: 0
NECK PAIN: 0
COUGH: 0
DIZZINESS: 0
DOUBLE VISION: 0
PALPITATIONS: 0
WHEEZING: 0
BLURRED VISION: 0
CHILLS: 0
MYALGIAS: 0
HEARTBURN: 0
DEPRESSION: 0
ABDOMINAL PAIN: 0
FEVER: 0
WEAKNESS: 0

## 2024-03-19 ASSESSMENT — FIBROSIS 4 INDEX: FIB4 SCORE: 0.79

## 2024-03-19 NOTE — PROGRESS NOTES
"Subjective:     CC: Lab review    HPI:   Eleni presents today for lab review.   She just establish care with me 2 weeks ago.  Routine lab work was very unremarkable.  Normal CBC, CMP, Lipid profile, TSH, Vit D.  Negative BRCA markers.  Patient feeling well no complaints.      ROS:  Review of Systems   Constitutional:  Negative for chills, fever and malaise/fatigue.   HENT:  Negative for nosebleeds and tinnitus.    Eyes:  Negative for blurred vision and double vision.   Respiratory:  Negative for cough, shortness of breath and wheezing.    Cardiovascular:  Negative for chest pain, palpitations and leg swelling.   Gastrointestinal:  Negative for abdominal pain, blood in stool, heartburn and melena.   Genitourinary:  Negative for dysuria, frequency and urgency.   Musculoskeletal:  Negative for myalgias and neck pain.   Skin:  Negative for rash.   Neurological:  Negative for dizziness, weakness and headaches.   Endo/Heme/Allergies:  Does not bruise/bleed easily.   Psychiatric/Behavioral:  Negative for depression and suicidal ideas.        Objective:     Exam:  /64 (BP Location: Left arm, Patient Position: Sitting, BP Cuff Size: Adult)   Pulse 63   Temp 36.6 °C (97.9 °F) (Temporal)   Ht 1.638 m (5' 4.5\")   Wt 68.5 kg (151 lb)   SpO2 96%   BMI 25.52 kg/m²  Body mass index is 25.52 kg/m².    Physical Exam  Vitals reviewed.   Constitutional:       General: She is not in acute distress.  HENT:      Head: Normocephalic and atraumatic.      Mouth/Throat:      Mouth: Mucous membranes are moist.   Eyes:      General: No scleral icterus.     Extraocular Movements: Extraocular movements intact.      Pupils: Pupils are equal, round, and reactive to light.   Cardiovascular:      Rate and Rhythm: Normal rate and regular rhythm.      Heart sounds: Normal heart sounds. No murmur heard.  Pulmonary:      Effort: Pulmonary effort is normal. No respiratory distress.      Breath sounds: Normal breath sounds. No wheezing. "   Abdominal:      General: There is no distension.      Palpations: Abdomen is soft.      Tenderness: There is no abdominal tenderness. There is no guarding or rebound.   Musculoskeletal:      Cervical back: Normal range of motion and neck supple.      Right lower leg: No edema.      Left lower leg: No edema.   Lymphadenopathy:      Cervical: No cervical adenopathy.   Skin:     General: Skin is warm.      Capillary Refill: Capillary refill takes less than 2 seconds.      Coloration: Skin is not jaundiced.   Neurological:      General: No focal deficit present.      Mental Status: She is alert.      Cranial Nerves: No cranial nerve deficit.   Psychiatric:         Mood and Affect: Mood normal.         Behavior: Behavior normal.       Labs: Reviewed    Assessment & Plan:     33 y.o. female with the following -     1. BMI 25.0-25.9,adult  - Borderline normal weight  - She exercises and eats healthy  - Weight might be normal range since weigh with clothes     2. Family history of breast cancer in sister  - BRCA testing negative  - Routine mammogram screening at age 38 and then yearly       Return in about 6 months (around 9/19/2024) for Annual.    Please note that this dictation was created using voice recognition software. I have made every reasonable attempt to correct obvious errors, but I expect that there are errors of grammar and possibly content that I did not discover before finalizing the note.